# Patient Record
Sex: MALE | Race: WHITE | URBAN - METROPOLITAN AREA
[De-identification: names, ages, dates, MRNs, and addresses within clinical notes are randomized per-mention and may not be internally consistent; named-entity substitution may affect disease eponyms.]

---

## 2023-12-18 ENCOUNTER — LAB OUTSIDE AN ENCOUNTER (OUTPATIENT)
Dept: URBAN - METROPOLITAN AREA CLINIC 74 | Facility: CLINIC | Age: 68
End: 2023-12-18

## 2023-12-18 ENCOUNTER — CLAIMS CREATED FROM THE CLAIM WINDOW (OUTPATIENT)
Dept: URBAN - METROPOLITAN AREA CLINIC 74 | Facility: CLINIC | Age: 68
End: 2023-12-18
Payer: MEDICARE

## 2023-12-18 VITALS
HEART RATE: 86 BPM | BODY MASS INDEX: 30.61 KG/M2 | HEIGHT: 67 IN | SYSTOLIC BLOOD PRESSURE: 144 MMHG | DIASTOLIC BLOOD PRESSURE: 86 MMHG | WEIGHT: 195 LBS | TEMPERATURE: 97.3 F | OXYGEN SATURATION: 96 %

## 2023-12-18 DIAGNOSIS — K76.0 FATTY LIVER: ICD-10-CM

## 2023-12-18 DIAGNOSIS — Z78.9 ALCOHOL USE: ICD-10-CM

## 2023-12-18 DIAGNOSIS — E66.9 OBESITY (BMI 30-39.9): ICD-10-CM

## 2023-12-18 DIAGNOSIS — R13.19 ESOPHAGEAL DYSPHAGIA: ICD-10-CM

## 2023-12-18 DIAGNOSIS — M62.08 DIASTASIS RECTI: ICD-10-CM

## 2023-12-18 DIAGNOSIS — R12 HEARTBURN: ICD-10-CM

## 2023-12-18 DIAGNOSIS — K21.00 GASTROESOPHAGEAL REFLUX DISEASE WITH ESOPHAGITIS WITHOUT HEMORRHAGE: ICD-10-CM

## 2023-12-18 PROBLEM — 219006: Status: ACTIVE | Noted: 2023-12-18

## 2023-12-18 PROBLEM — 16331000: Status: ACTIVE | Noted: 2023-12-18

## 2023-12-18 PROBLEM — 40890009: Status: ACTIVE | Noted: 2023-12-18

## 2023-12-18 PROBLEM — 266433003: Status: ACTIVE | Noted: 2023-12-18

## 2023-12-18 PROBLEM — 162864005: Status: ACTIVE | Noted: 2023-12-18

## 2023-12-18 PROBLEM — 62629000: Status: ACTIVE | Noted: 2023-12-18

## 2023-12-18 PROBLEM — 197321007: Status: ACTIVE | Noted: 2023-12-18

## 2023-12-18 PROCEDURE — 99244 OFF/OP CNSLTJ NEW/EST MOD 40: CPT | Performed by: INTERNAL MEDICINE

## 2023-12-18 PROCEDURE — 99204 OFFICE O/P NEW MOD 45 MIN: CPT | Performed by: INTERNAL MEDICINE

## 2023-12-18 RX ORDER — TRAMADOL HYDROCHLORIDE 50 MG/1
1 TABLET AS NEEDED TABLET, FILM COATED ORAL ONCE A DAY
Status: ACTIVE | COMMUNITY

## 2023-12-18 RX ORDER — BENAZEPRIL HYDROCHLORIDE 40 MG/1
1 TABLET TABLET ORAL ONCE A DAY
Status: ACTIVE | COMMUNITY

## 2023-12-18 RX ORDER — CLOTRIMAZOLE 10 MG/G
1 APPLICATION CREAM TOPICAL TWICE A DAY
Status: ACTIVE | COMMUNITY

## 2023-12-18 RX ORDER — ESOMEPRAZOLE MAGNESIUM 40 MG/1
1 CAPSULE CAPSULE, DELAYED RELEASE ORAL ONCE A DAY
Status: ON HOLD | COMMUNITY

## 2023-12-18 RX ORDER — PANTOPRAZOLE SODIUM 40 MG/1
1 TABLET TABLET, DELAYED RELEASE ORAL ONCE A DAY
Status: ACTIVE | COMMUNITY

## 2023-12-18 RX ORDER — ZOSTER VACCINE RECOMBINANT, ADJUVANTED 50 MCG/0.5
AS DIRECTED KIT INTRAMUSCULAR
Status: ACTIVE | COMMUNITY

## 2023-12-18 RX ORDER — AMLODIPINE BESYLATE 10 MG/1
1 TABLET TABLET ORAL ONCE A DAY
Status: ACTIVE | COMMUNITY

## 2023-12-18 NOTE — HPI-TODAY'S VISIT:
The patient is 68-year-old  male patient of Dr. Herb Echols who is referred for consultation.  A copy of these document is being forwarded to the referring physician.The patient presents to the office stating that he has experienced gastroesophageal reflux, heartburn and distal esophageal dysphagia for about a year.  The patient was at the time being cared for by Dr. Virgilio Monsivais.  The patient presented to him in March 2023 complaining of the above symptoms and did undergo an upper endoscopy which revealed a 5 mm sessile polyp in the lesser curvature which was removed with a cold snare, a 2 cm hiatal hernia, LA grade C esophagitis in between 34 to 36 cm from the incisors.  Biopsies revealed squamous mucosa with histological changes consistent with moderate reflux esophagitis and focal epithelial ulceration, negative for eosinophilic esophagitis and Du's.  The polyp turned to be a fundic gland polyp with no evidence of H. pylori dysplasia or malignancy. The patient was treated with pantoprazole 40 mg daily which she took for a while until he consider diet by modifying his diet he would do well off medication and he tried to come off medication and became symptomatic. Currently the patient complains of gastroesophageal reflux which occurs after meals, it also occurs during the evening on a recumbent position with episodes of cough but no aspiration, the patient complains of heartburn, the patient does complain of intermittent episodes of dysphagia to solids, dysphagia is usually located by the patient on the distal esophagus and could last up to 30 minutes before he is able to continue eating.  It occurs with solids but not with liquids. The patient denies having any hematemesis or melena, does not have any chest pain.  There has been no unexplained weight loss, there is no family history of esophageal cancer or gastric cancer. The patient has been recommended to get the barium swallow with liquid barium and a barium tablet, will follow antireflux measures and diet and remain on pantoprazole 40 mg daily.  The patient will be scheduled to get an EGD with biopsies and possible dilation.  Benefits potential complications and alternatives to EGD were disclosed. The patient states that he is having type IV and type V stools on the Crosby scale with no blood.  There is no family history of colon cancer or colonic polyps. The patient states that he did have a normal colonoscopy 5 years ago.  We will obtain results for review.  The patient has a history of alcohol use, he drinks some wine every evening and has done so for a long time, the patient is overweight.The patient has been recommended to get a right upper quadrant ultrasound and a hepatic panel with fib 4 looking for fatty liver.  The patient will return for a follow-up visit after completion of testing.

## 2023-12-18 NOTE — PHYSICAL EXAM GASTROINTESTINAL
Abdomen , soft, nontender, nondistended , no guarding or rigidity , no masses palpable , normal bowel sounds , Liver and Spleen,  no hepatosplenomegaly , liver nontender, diastasis recti.

## 2023-12-28 ENCOUNTER — TELEPHONE ENCOUNTER (OUTPATIENT)
Dept: URBAN - METROPOLITAN AREA CLINIC 74 | Facility: CLINIC | Age: 68
End: 2023-12-28

## 2024-01-12 ENCOUNTER — OFFICE VISIT (OUTPATIENT)
Dept: URBAN - METROPOLITAN AREA CLINIC 39 | Facility: CLINIC | Age: 69
End: 2024-01-12
Payer: MEDICARE

## 2024-01-12 DIAGNOSIS — K76.89 HEPATIC CYST: ICD-10-CM

## 2024-01-12 PROCEDURE — 76705 ECHO EXAM OF ABDOMEN: CPT | Performed by: INTERNAL MEDICINE

## 2024-01-16 ENCOUNTER — OFFICE VISIT (OUTPATIENT)
Dept: URBAN - METROPOLITAN AREA SURGERY CENTER 30 | Facility: SURGERY CENTER | Age: 69
End: 2024-01-16

## 2024-01-23 ENCOUNTER — OFFICE VISIT (OUTPATIENT)
Dept: URBAN - METROPOLITAN AREA SURGERY CENTER 30 | Facility: SURGERY CENTER | Age: 69
End: 2024-01-23
Payer: MEDICARE

## 2024-01-23 ENCOUNTER — OFFICE VISIT (OUTPATIENT)
Dept: URBAN - METROPOLITAN AREA SURGERY CENTER 30 | Facility: SURGERY CENTER | Age: 69
End: 2024-01-23

## 2024-01-23 ENCOUNTER — CLAIMS CREATED FROM THE CLAIM WINDOW (OUTPATIENT)
Dept: URBAN - METROPOLITAN AREA CLINIC 4 | Facility: CLINIC | Age: 69
End: 2024-01-23
Payer: MEDICARE

## 2024-01-23 DIAGNOSIS — K31.89 OTHER DISEASES OF STOMACH AND DUODENUM: ICD-10-CM

## 2024-01-23 DIAGNOSIS — K22.89 MUCOSAL ABNORMALITY OF ESOPHAGUS: ICD-10-CM

## 2024-01-23 DIAGNOSIS — K29.70 GASTRITIS, UNSPECIFIED, WITHOUT BLEEDING: ICD-10-CM

## 2024-01-23 DIAGNOSIS — K31.7 BENIGN GASTRIC POLYP: ICD-10-CM

## 2024-01-23 DIAGNOSIS — R93.3 ABN FINDINGS-GI TRACT: ICD-10-CM

## 2024-01-23 DIAGNOSIS — K21.9 GASTRO-ESOPHAGEAL REFLUX: ICD-10-CM

## 2024-01-23 DIAGNOSIS — K21.9 ACID REFLUX: ICD-10-CM

## 2024-01-23 DIAGNOSIS — K31.7 GASTRIC POLYP: ICD-10-CM

## 2024-01-23 DIAGNOSIS — T47.8X5A ADVERSE EFFECT OF OTHER AGENTS PRIMARILY AFFECTING GASTROINTESTINAL SYSTEM, INITIAL ENCOUNTER: ICD-10-CM

## 2024-01-23 PROCEDURE — 00731 ANES UPR GI NDSC PX NOS: CPT | Performed by: NURSE ANESTHETIST, CERTIFIED REGISTERED

## 2024-01-23 PROCEDURE — G8907 PT DOC NO EVENTS ON DISCHARG: HCPCS | Performed by: CLINIC/CENTER

## 2024-01-23 PROCEDURE — 43239 EGD BIOPSY SINGLE/MULTIPLE: CPT | Performed by: CLINIC/CENTER

## 2024-01-23 PROCEDURE — 88305 TISSUE EXAM BY PATHOLOGIST: CPT | Performed by: PATHOLOGY

## 2024-01-23 PROCEDURE — 43239 EGD BIOPSY SINGLE/MULTIPLE: CPT | Performed by: INTERNAL MEDICINE

## 2024-01-23 PROCEDURE — 88312 SPECIAL STAINS GROUP 1: CPT | Performed by: PATHOLOGY

## 2024-01-23 RX ORDER — PANTOPRAZOLE SODIUM 40 MG/1
1 TABLET TABLET, DELAYED RELEASE ORAL ONCE A DAY
Status: ACTIVE | COMMUNITY

## 2024-01-23 RX ORDER — TRAMADOL HYDROCHLORIDE 50 MG/1
1 TABLET AS NEEDED TABLET, FILM COATED ORAL ONCE A DAY
Status: ACTIVE | COMMUNITY

## 2024-01-23 RX ORDER — ESOMEPRAZOLE MAGNESIUM 40 MG/1
1 CAPSULE CAPSULE, DELAYED RELEASE ORAL ONCE A DAY
Status: ON HOLD | COMMUNITY

## 2024-01-23 RX ORDER — CLOTRIMAZOLE 10 MG/G
1 APPLICATION CREAM TOPICAL TWICE A DAY
Status: ACTIVE | COMMUNITY

## 2024-01-23 RX ORDER — BENAZEPRIL HYDROCHLORIDE 40 MG/1
1 TABLET TABLET ORAL ONCE A DAY
Status: ACTIVE | COMMUNITY

## 2024-01-23 RX ORDER — ZOSTER VACCINE RECOMBINANT, ADJUVANTED 50 MCG/0.5
AS DIRECTED KIT INTRAMUSCULAR
Status: ACTIVE | COMMUNITY

## 2024-01-23 RX ORDER — AMLODIPINE BESYLATE 10 MG/1
1 TABLET TABLET ORAL ONCE A DAY
Status: ACTIVE | COMMUNITY

## 2024-02-19 ENCOUNTER — EGD (OUTPATIENT)
Dept: URBAN - METROPOLITAN AREA SURGERY CENTER 30 | Facility: SURGERY CENTER | Age: 69
End: 2024-02-19

## 2024-02-19 ENCOUNTER — OV EP (OUTPATIENT)
Dept: URBAN - METROPOLITAN AREA CLINIC 74 | Facility: CLINIC | Age: 69
End: 2024-02-19
Payer: MEDICARE

## 2024-02-19 VITALS
BODY MASS INDEX: 29.98 KG/M2 | OXYGEN SATURATION: 96 % | TEMPERATURE: 96.9 F | HEIGHT: 67 IN | WEIGHT: 191 LBS | SYSTOLIC BLOOD PRESSURE: 138 MMHG | HEART RATE: 70 BPM | DIASTOLIC BLOOD PRESSURE: 80 MMHG

## 2024-02-19 DIAGNOSIS — K22.4 ESOPHAGEAL DYSMOTILITY: ICD-10-CM

## 2024-02-19 DIAGNOSIS — K31.89 REACTIVE GASTROPATHY: ICD-10-CM

## 2024-02-19 DIAGNOSIS — R12 HEARTBURN: ICD-10-CM

## 2024-02-19 DIAGNOSIS — R13.19 ESOPHAGEAL DYSPHAGIA: ICD-10-CM

## 2024-02-19 DIAGNOSIS — Z78.9 ALCOHOL USE: ICD-10-CM

## 2024-02-19 DIAGNOSIS — K76.0 FATTY LIVER: ICD-10-CM

## 2024-02-19 DIAGNOSIS — M62.08 DIASTASIS RECTI: ICD-10-CM

## 2024-02-19 DIAGNOSIS — D13.1 BENIGN FUNDIC GLAND POLYPS OF STOMACH: ICD-10-CM

## 2024-02-19 DIAGNOSIS — K76.89 LIVER CYST: ICD-10-CM

## 2024-02-19 DIAGNOSIS — E66.9 OBESITY (BMI 30-39.9): ICD-10-CM

## 2024-02-19 DIAGNOSIS — K44.9 HIATAL HERNIA: ICD-10-CM

## 2024-02-19 DIAGNOSIS — K21.9 GERD WITHOUT ESOPHAGITIS: ICD-10-CM

## 2024-02-19 PROBLEM — 78809005: Status: ACTIVE | Noted: 2024-02-19

## 2024-02-19 PROBLEM — 266434009: Status: ACTIVE | Noted: 2024-02-19

## 2024-02-19 PROBLEM — 266435005: Status: ACTIVE | Noted: 2024-02-19

## 2024-02-19 PROBLEM — 85057007: Status: ACTIVE | Noted: 2024-02-19

## 2024-02-19 PROBLEM — 399379004: Status: ACTIVE | Noted: 2024-02-19

## 2024-02-19 PROBLEM — 84089009: Status: ACTIVE | Noted: 2024-02-19

## 2024-02-19 PROCEDURE — 99213 OFFICE O/P EST LOW 20 MIN: CPT | Performed by: INTERNAL MEDICINE

## 2024-02-19 RX ORDER — PANTOPRAZOLE SODIUM 40 MG/1
1 TABLET TABLET, DELAYED RELEASE ORAL ONCE A DAY
Qty: 90 | Refills: 3

## 2024-02-19 RX ORDER — AMLODIPINE BESYLATE 10 MG/1
1 TABLET TABLET ORAL ONCE A DAY
Status: ACTIVE | COMMUNITY

## 2024-02-19 RX ORDER — PANTOPRAZOLE SODIUM 40 MG/1
1 TABLET TABLET, DELAYED RELEASE ORAL ONCE A DAY
Status: ACTIVE | COMMUNITY

## 2024-02-19 RX ORDER — BENAZEPRIL HYDROCHLORIDE 40 MG/1
1 TABLET TABLET ORAL ONCE A DAY
Status: ACTIVE | COMMUNITY

## 2024-02-19 NOTE — HPI-TODAY'S VISIT:
The patient is 68-year-old  male patient of Dr. Herb Echols who is referred for consultation.  A copy of these document is being forwarded to the referring physician.The patient presents to the office stating that he has experienced gastroesophageal reflux, heartburn and distal esophageal dysphagia for about a year.  The patient was at the time being cared for by Dr. Virgilio Monsivais.  The patient presented to him in March 2023 complaining of the above symptoms and did undergo an upper endoscopy which revealed a 5 mm sessile polyp in the lesser curvature which was removed with a cold snare, a 2 cm hiatal hernia, LA grade C esophagitis in between 34 to 36 cm from the incisors.  Biopsies revealed squamous mucosa with histological changes consistent with moderate reflux esophagitis and focal epithelial ulceration, negative for eosinophilic esophagitis and Du's.  The polyp turned to be a fundic gland polyp with no evidence of H. pylori dysplasia or malignancy. The patient was treated with pantoprazole 40 mg daily which he took for a while until he consider diet by modifying his diet he would do well off medication and he tried to come off medication and became symptomatic. Currently the patient complains of gastroesophageal reflux which occurs after meals, it also occurs during the evening on a recumbent position with episodes of cough but no aspiration, the patient complains of heartburn, the patient does complain of intermittent episodes of dysphagia to solids, dysphagia is usually located by the patient on the distal esophagus and could last up to 30 minutes before he is able to continue eating.  It occurs with solids but not with liquids. The patient denies having any hematemesis or melena, does not have any chest pain.  There has been no unexplained weight loss, there is no family history of esophageal cancer or gastric cancer. The patient has been recommended to get the barium swallow with liquid barium and a barium tablet, will follow antireflux measures and diet and remain on pantoprazole 40 mg daily.  The patient will be scheduled to get an EGD with biopsies and possible dilation.  Benefits potential complications and alternatives to EGD were disclosed. The patient states that he is having type IV and type V stools on the Gary scale with no blood.  There is no family history of colon cancer or colonic polyps. The patient states that he did have a normal colonoscopy 5 years ago.  We will obtain results for review.  The patient has a history of alcohol use, he drinks some wine every evening and has done so for a long time, the patient is overweight.The patient has been recommended to get a right upper quadrant ultrasound and a hepatic panel with fib 4 looking for fatty liver.  The patient will return for a follow-up visit after completion of testing.  Today February 19, 2024 the patient returns for a follow-up visit, the patient was last seen on December 18, 2023 with gastroesophageal reflux, heartburn, esophageal dysphagia, obesity, fatty liver, diastases recti and alcohol use.  At the time of the visit the patient complained of gastroesophageal reflux and heartburn, esophageal dysphagia for one year.  The patient had in March 2023 complained of the above symptoms and did undergo an upper endoscopy which revealed a 5 mm sessile polyp in the lesser curvature which was removed with a cold snare, 2 cm hiatal hernia, LA grade C esophagitis at the level of 34 to 36 cm from the incisors. Biopsies revealed squamous mucosa with histological changes consistent with moderate reflux esophagitis and focal epithelial ulceration negative for eosinophilic esophagitis and Du's. The patient's polyp was a fundic gland polyp with no evidence of H. pylori, dysplasia or malignancy. The patient was treated with pantoprazole 40 mg daily, the patient took a while and modifying the diet thinks that he could stop the medication but he became symptomatic. The patient's complaiedt was that of postprandial heartburn which occurred after meals, heartburn in the evening and on a recumbent position with episodes of cough but no aspiration.  The patient had intermittent episodes of dysphagia to solids, at times she took 30 minutes before he was able to continue eating.  There was no evidence of GI bleeding or chest pain. There was no weight loss. At the time the patient was recommended to get the barium swallow, continue taking pantoprazole 40 mg daily and to get an EGD. At that time the patient was having type IV or V stools on the Gary scale with no blood, there was no family history of colon cancer or colon polyps, the patient stated that he did have a normal colonoscopy 5 years before, records were requested for review.   The patient was drinking alcoholic beverages, mostly wine every evening.    At the time we also recommended the right upper quadrant ultrasound and a hepatic panel with fib 4 the barium swallow obtained December 22, 2023 revealed a small to moderate size hiatal hernia, moderate to severe gastroesophageal dysmotility with reflux into the proximal third negative for stricture and no mucosal abnormalities the barium tablet advanced into the stomach without delay.    The EGD revealed a medium size hiatal hernia, normal-looking duodenum, antral erythema, fundic gland polyps moderate distal and mid esophageal tortuosity with no evidence of stricture.  Biopsies revealed normal duodenal mucosa, mild chemical reactive gastropathy with proton pump inhibitor effect H. pylori negative negative for intestinal metaplasia, benign fundic gland polyp and squamous mucosa with reflux type changes with no evidence of Du's or eosinophilic esophagitis. The right upper quadrant ultrasound revealed changes suggestive of mild fatty infiltration and a small hepatic cyst measuring 2.5 cm with internal septation as well as smaller hepatic cysts. There is no result of the hepatic function panel with fib 4 Today the patient returns to the patient the office stating that as long as he follows antireflux measures and diet and takes pantoprazole 40 mg daily he has fewer episodes of reflux, heartburn or dysphagia.  The patient has been made aware that only endoscopy he did have a medium size hiatal hernia, H. pylori negative reactive gastropathy with benign fundic gland polyps, esophageal changes related to reflux without ulceration Du's or eosinophilic esophagitis, he did have tortuosity in the mid and distal esophagus.  The patient on the barium swallow had reflux to the upper third of the esophagus, esophageal dysmotility with no evidence of obstruction and a barium tablet advanced into the stomach without difficulty.The patient will remain on antireflux measures and diet and will continue to take pantoprazole 40 mg daily.  I have made the patient aware that he does have changes suggestive of fatty liver on the recent ultrasound as well as benign looking.  Liver cysts. The patient agreed to get a hepatic function panel with fib 4, we will contact the patient with reports and recommendations. I have reviewed past medical records which corroborated the fact that the patient did have esophagitis in the recent past.  The patient's last colonoscopy was performed in 2018, there is no evidence that the patient had any colonic abnormalities and will be due to have a colonoscopy in 2028. The patient will return for follow-up visit in November 2024 to be scheduled for a follow-up right upper quadrant ultrasound.

## 2024-09-24 ENCOUNTER — APPOINTMENT (RX ONLY)
Dept: URBAN - METROPOLITAN AREA CLINIC 162 | Facility: CLINIC | Age: 69
Setting detail: DERMATOLOGY
End: 2024-09-24

## 2024-09-24 DIAGNOSIS — D22 MELANOCYTIC NEVI: ICD-10-CM

## 2024-09-24 DIAGNOSIS — L81.4 OTHER MELANIN HYPERPIGMENTATION: ICD-10-CM

## 2024-09-24 DIAGNOSIS — L57.8 OTHER SKIN CHANGES DUE TO CHRONIC EXPOSURE TO NONIONIZING RADIATION: ICD-10-CM

## 2024-09-24 DIAGNOSIS — L57.0 ACTINIC KERATOSIS: ICD-10-CM

## 2024-09-24 PROBLEM — D22.39 MELANOCYTIC NEVI OF OTHER PARTS OF FACE: Status: ACTIVE | Noted: 2024-09-24

## 2024-09-24 PROCEDURE — 17000 DESTRUCT PREMALG LESION: CPT

## 2024-09-24 PROCEDURE — ? COUNSELING

## 2024-09-24 PROCEDURE — 99214 OFFICE O/P EST MOD 30 MIN: CPT | Mod: 25

## 2024-09-24 PROCEDURE — ? PRESCRIPTION

## 2024-09-24 PROCEDURE — ? LIQUID NITROGEN

## 2024-09-24 PROCEDURE — 17003 DESTRUCT PREMALG LES 2-14: CPT

## 2024-09-24 RX ORDER — FLUOROURACIL 5 MG/G
CREAM TOPICAL
Qty: 40 | Refills: 0 | Status: ERX | COMMUNITY
Start: 2024-09-24

## 2024-09-24 RX ADMIN — FLUOROURACIL: 5 CREAM TOPICAL at 00:00

## 2024-09-24 ASSESSMENT — LOCATION DETAILED DESCRIPTION DERM
LOCATION DETAILED: LEFT INFERIOR TEMPLE
LOCATION DETAILED: LEFT SUPERIOR LATERAL BUCCAL CHEEK
LOCATION DETAILED: LEFT INFERIOR LATERAL FOREHEAD
LOCATION DETAILED: LEFT MEDIAL FOREHEAD
LOCATION DETAILED: RIGHT CENTRAL MALAR CHEEK
LOCATION DETAILED: RIGHT MEDIAL MALAR CHEEK
LOCATION DETAILED: RIGHT MID TEMPLE
LOCATION DETAILED: LEFT INFERIOR MEDIAL BUCCAL CHEEK
LOCATION DETAILED: RIGHT INFERIOR MEDIAL BUCCAL CHEEK
LOCATION DETAILED: RIGHT INFERIOR TEMPLE
LOCATION DETAILED: LEFT LATERAL FOREHEAD
LOCATION DETAILED: RIGHT INFERIOR FOREHEAD
LOCATION DETAILED: LEFT SUPERIOR FOREHEAD
LOCATION DETAILED: LEFT INFERIOR CENTRAL MALAR CHEEK
LOCATION DETAILED: RIGHT LOWER CUTANEOUS LIP
LOCATION DETAILED: RIGHT SUPERIOR CENTRAL MALAR CHEEK
LOCATION DETAILED: LEFT NASAL SIDEWALL

## 2024-09-24 ASSESSMENT — LOCATION SIMPLE DESCRIPTION DERM
LOCATION SIMPLE: RIGHT TEMPLE
LOCATION SIMPLE: LEFT CHEEK
LOCATION SIMPLE: LEFT FOREHEAD
LOCATION SIMPLE: RIGHT LIP
LOCATION SIMPLE: RIGHT CHEEK
LOCATION SIMPLE: RIGHT FOREHEAD
LOCATION SIMPLE: LEFT NOSE
LOCATION SIMPLE: LEFT TEMPLE

## 2024-09-24 ASSESSMENT — LOCATION ZONE DERM
LOCATION ZONE: LIP
LOCATION ZONE: NOSE
LOCATION ZONE: FACE

## 2024-11-20 ENCOUNTER — OFFICE VISIT (OUTPATIENT)
Dept: URBAN - METROPOLITAN AREA CLINIC 74 | Facility: CLINIC | Age: 69
End: 2024-11-20